# Patient Record
Sex: FEMALE | Race: WHITE | HISPANIC OR LATINO | Employment: UNEMPLOYED | URBAN - METROPOLITAN AREA
[De-identification: names, ages, dates, MRNs, and addresses within clinical notes are randomized per-mention and may not be internally consistent; named-entity substitution may affect disease eponyms.]

---

## 2020-07-09 ENCOUNTER — HOSPITAL ENCOUNTER (OUTPATIENT)
Facility: HOSPITAL | Age: 25
Discharge: HOME | End: 2020-07-10
Attending: OBSTETRICS & GYNECOLOGY | Admitting: OBSTETRICS & GYNECOLOGY
Payer: MEDICAID

## 2020-07-09 PROCEDURE — 99211 OFF/OP EST MAY X REQ PHY/QHP: CPT

## 2020-07-10 VITALS
HEART RATE: 101 BPM | SYSTOLIC BLOOD PRESSURE: 103 MMHG | TEMPERATURE: 98 F | DIASTOLIC BLOOD PRESSURE: 70 MMHG | RESPIRATION RATE: 20 BRPM | OXYGEN SATURATION: 100 %

## 2020-07-10 PROBLEM — U07.1 COVID-19: Status: ACTIVE | Noted: 2020-07-10

## 2020-07-10 LAB
BILIRUB UR QL STRIP: NEGATIVE
CLARITY UR: ABNORMAL
COLOR UR: YELLOW
FIBRONECTIN FETAL SPEC QL: NEGATIVE
GLUCOSE UR QL STRIP: NEGATIVE
HGB UR QL STRIP: NEGATIVE
KETONES UR QL STRIP: NEGATIVE
LEUKOCYTE ESTERASE UR QL STRIP: ABNORMAL
MICROSCOPIC COMMENT: NORMAL
NITRITE UR QL STRIP: NEGATIVE
PH UR STRIP: 6 [PH] (ref 5–8)
PROT UR QL STRIP: NEGATIVE
RBC #/AREA URNS HPF: 1 /HPF (ref 0–4)
SARS-COV-2 RDRP RESP QL NAA+PROBE: POSITIVE
SP GR UR STRIP: 1.01 (ref 1–1.03)
SQUAMOUS #/AREA URNS HPF: 7 /HPF
URN SPEC COLLECT METH UR: ABNORMAL
UROBILINOGEN UR STRIP-ACNC: ABNORMAL EU/DL
WBC #/AREA URNS HPF: 2 /HPF (ref 0–5)

## 2020-07-10 PROCEDURE — 25000003 PHARM REV CODE 250: Performed by: OBSTETRICS & GYNECOLOGY

## 2020-07-10 PROCEDURE — 81000 URINALYSIS NONAUTO W/SCOPE: CPT

## 2020-07-10 PROCEDURE — U0002 COVID-19 LAB TEST NON-CDC: HCPCS

## 2020-07-10 PROCEDURE — 59025 FETAL NON-STRESS TEST: CPT

## 2020-07-10 PROCEDURE — 82731 ASSAY OF FETAL FIBRONECTIN: CPT

## 2020-07-10 RX ORDER — ASPIRIN 325 MG
325 TABLET ORAL ONCE
Status: COMPLETED | OUTPATIENT
Start: 2020-07-10 | End: 2020-07-10

## 2020-07-10 RX ORDER — ACETAMINOPHEN 500 MG
1000 TABLET ORAL ONCE
Status: COMPLETED | OUTPATIENT
Start: 2020-07-10 | End: 2020-07-10

## 2020-07-10 RX ORDER — ACETAMINOPHEN 500 MG
1000 TABLET ORAL EVERY 6 HOURS PRN
Status: DISCONTINUED | OUTPATIENT
Start: 2020-07-10 | End: 2020-07-10 | Stop reason: HOSPADM

## 2020-07-10 RX ADMIN — ACETAMINOPHEN 1000 MG: 500 TABLET ORAL at 12:07

## 2020-07-10 RX ADMIN — ACETAMINOPHEN 1000 MG: 500 TABLET ORAL at 11:07

## 2020-07-10 RX ADMIN — ASPIRIN 325 MG ORAL TABLET 325 MG: 325 PILL ORAL at 05:07

## 2020-07-10 NOTE — H&P
History and Physical  Obstetrics      CC: cough, body aches    HPI: Heather Arguello is a 25 y.o.  female at 26w6d presents to hospital c/o cough and body aches. Her Covid is positive. Started symptoms on  (5 days ago)  Denies vb, d/c, LOF. States Good fetal movement      Patient Active Problem List   Diagnosis    COVID-19     No medications prior to admission.     Review of patient's allergies indicates:  No Known Allergies   History reviewed. No pertinent past medical history.  History reviewed. No pertinent surgical history.  History reviewed. No pertinent family history.  Social History     Tobacco Use    Smoking status: Never Smoker    Smokeless tobacco: Never Used   Substance Use Topics    Alcohol use: Not Currently    Drug use: Never        ROS: systems reviewed and are negative.     Vital Signs (Most Recent)  Temp:  [98.2 °F (36.8 °C)-101.5 °F (38.6 °C)] 100.5 °F (38.1 °C)  Pulse:  [] 96  Resp:  [18] 18  SpO2:  [96 %-100 %] 99 %  BP: ()/(51-74) 95/54    Physical Exam:  General:  Normal, alert and oriented   CV:  Regular rate   Lungs: Normal respiratory effort   Abd: Gravid, Nondistended, Nt,  No Guarding/rebounding   Extremeties: Nt, no significant assymetric swelling           Uterine Size:  c/w dates   Presentations:  vertex   FHT: reviewed   Pelvis: df   SVE:  df     Laboratory:  No results for input(s): WBC, HGB, HCT, PLT in the last 168 hours.  No results for input(s): NA, K, CL, CO2, BUN, CREATININE, CALCIUM, PROT, BILITOT, ALKPHOS, ALT, AST, GLUCOSE in the last 168 hours.    Invalid input(s): LABALBU      ASSESSMENT/PLAN:     25 y.o.  with IUP at 26w6d gestation.  Covid Positive- quarantine for 14 days since start of symptoms. [facility] 2 wks from now. Precautions to return to hospital. Pt's  in currently working. Recommended for her family to get tested as well.

## 2020-07-10 NOTE — NURSING
0000: pt states that she came to hospital bc she has had a cough and body aches and decreased appetite since Sunday. No pregnany related complaints, denies ctx, bleeding or leaking. Pt states no fever but presented with 101.5 on unit.

## 2020-07-10 NOTE — NURSING
Report received from JULIAN Alejandra RN; care assumed. AAOx3, assessment completed. Denies pain, rates pain 0/10 using pain scale. Abdomen soft, non tender during palpation.Temp 99.5. Citizen of [place] speaking only, needs interpretor. POC reviewed with pt, pt verb understanding. Call bell within reach, will monitor. NAD.     1133: Dr. Hodge notified of temp 100.5. MD gave order for Tylenol 1000 mg po q 6 h prn temp. MD also gave order to call HS to ask if there is a need for a ID consult.     1200: Spoke to HS about possible covid algorithm, [facility] () stated that there is not an algorithm to follow for covid + pts. Relayed message to Dr. Hodge. Dr. Hodge will be rounding on pt shortly.     1545: Dr. Hodge to , speaking to pt about current poc. MD gave order to d/c pt home after  gets off work.     1800:Pt d/c'd home. D/c instructions given to pt in Nepali, pt verb understanding. Pt currently waiting for  to pick her up after work. Will notify nursing staff when he arrives.

## 2020-07-10 NOTE — NURSING
0615:dr. Aceves updated on pt. said pt does not have to be on cont. monitoring and may be changed to NST q shift.

## 2020-07-10 NOTE — NURSING
0240:dr. wood updated on pt results. Said to keep pt until rounds in the morning. Tylenol PRN for fever.

## 2020-07-10 NOTE — DISCHARGE INSTRUCTIONS
Home Undelivered Discharge Instructions    After Discharge Orders:    No future appointments.    Follow up with Dr. Cai in 2 weeks. Call office for appointment.  Take 1 aspirin every day for 3 weeks.    There are no discharge medications for this patient.              · Diet:  normal diet as tolerated    · Rest: normal activity as tolerated    Other instructions: Do kick counts once a day on your baby. Choose the time of day your baby is most active. Get in a comfortable lying or sitting position and time how long it takes to feel 10 kicks, twists, turns, swishes, or rolls. Call your physician or midwife if there have not been 10 kicks in 2 hours    Call physician or midwife, return to Labor and Delivery, call 911, or go to the nearest Emergency Room if: increased leakage or fluid, contractions more than  3 per  1 hour, decreased fetal movement, persistent low back pain or cramping, bleeding from vaginal area, difficulty urinating, pain with urination or difficulty breathing

## 2020-07-11 DIAGNOSIS — U07.1 COVID-19 VIRUS DETECTED: ICD-10-CM

## 2020-07-12 ENCOUNTER — NURSE TRIAGE (OUTPATIENT)
Dept: ADMINISTRATIVE | Facility: CLINIC | Age: 25
End: 2020-07-12

## 2020-07-12 NOTE — TELEPHONE ENCOUNTER
Reason for Disposition   COVID-19 Prevention and Healthy Living, questions about   COVID-19 Home Isolation, questions about   [1] COVID-19 infection diagnosed or suspected AND [2] mild symptoms (fever, cough) AND [3] no trouble breathing or other complications    Additional Information   Negative: Severe difficulty breathing (e.g., struggling for each breath, speaks in single words)   Negative: Difficult to awaken or acting confused (e.g., disoriented, slurred speech)   Negative: Bluish (or gray) lips or face now   Negative: Shock suspected (e.g., cold/pale/clammy skin, too weak to stand, low BP, rapid pulse)   Negative: Sounds like a life-threatening emergency to the triager   Negative: [1] COVID-19 suspected (e.g., cough, fever, shortness of breath) AND [2] mild symptoms AND [3] public health department recommends testing   Negative: [1] COVID-19 exposure AND [2] no symptoms   Negative: COVID-19 and Breastfeeding, questions about   Negative: SEVERE or constant chest pain (Exception: mild central chest pain, present only when coughing)   Negative: MODERATE difficulty breathing (e.g., speaks in phrases, SOB even at rest, pulse 100-120)   Negative: Patient sounds very sick or weak to the triager   Negative: MILD difficulty breathing (e.g., minimal/no SOB at rest, SOB with walking, pulse <100)   Negative: Chest pain   Negative: Fever > 103 F (39.4 C)   Negative: [1] Fever > 101 F (38.3 C) AND [2] age > 60   Negative: [1] Fever > 100.0 F (37.8 C) AND [2] bedridden (e.g., nursing home patient, CVA, chronic illness, recovering from surgery)   Negative: HIGH RISK patient (e.g., age > 64 years, diabetes, heart or lung disease, weak immune system)   Negative: Fever present > 3 days (72 hours)   Negative: [1] Fever returns after gone for over 24 hours AND [2] symptoms worse or not improved   Negative: [1] Continuous (nonstop) coughing interferes with work or school AND [2] no improvement using cough  treatment per protocol   Negative: Cough present > 3 weeks    Protocols used: CORONAVIRUS (COVID-19) - DIAGNOSED OR [id number]

## 2020-07-12 NOTE — TELEPHONE ENCOUNTER
Patient auto populated to Covid 19 monitoring system que in response to text message  Spoke with patient via  #[phone].  Patient is 26 weeks pregnant and just found out her Covid test was positive  Patient stated she will be quarintined for 14 days  Patient's only symptom is dry cough and upper back pain when coughing.  Patient denies fever, SOB, myalgias and sore throat  Patient has OB MD and provided patient with OOC phone number if needs arise